# Patient Record
Sex: MALE | Race: WHITE | Employment: UNEMPLOYED | URBAN - METROPOLITAN AREA
[De-identification: names, ages, dates, MRNs, and addresses within clinical notes are randomized per-mention and may not be internally consistent; named-entity substitution may affect disease eponyms.]

---

## 2018-05-03 ENCOUNTER — OFFICE VISIT (OUTPATIENT)
Dept: VASCULAR SURGERY | Age: 48
End: 2018-05-03

## 2018-05-03 VITALS
HEIGHT: 75 IN | WEIGHT: 315 LBS | SYSTOLIC BLOOD PRESSURE: 130 MMHG | HEART RATE: 88 BPM | BODY MASS INDEX: 39.17 KG/M2 | DIASTOLIC BLOOD PRESSURE: 86 MMHG | RESPIRATION RATE: 20 BRPM

## 2018-05-03 DIAGNOSIS — G89.29 OTHER CHRONIC PAIN: ICD-10-CM

## 2018-05-03 DIAGNOSIS — M79.605 PAIN IN BOTH LOWER EXTREMITIES: Primary | ICD-10-CM

## 2018-05-03 DIAGNOSIS — M79.605 PAIN IN BOTH LOWER EXTREMITIES: ICD-10-CM

## 2018-05-03 DIAGNOSIS — G89.29 CHRONIC BACK PAIN, UNSPECIFIED BACK LOCATION, UNSPECIFIED BACK PAIN LATERALITY: ICD-10-CM

## 2018-05-03 DIAGNOSIS — M54.2 NECK PAIN: ICD-10-CM

## 2018-05-03 DIAGNOSIS — Z72.0 TOBACCO ABUSE: ICD-10-CM

## 2018-05-03 DIAGNOSIS — M54.9 CHRONIC BACK PAIN, UNSPECIFIED BACK LOCATION, UNSPECIFIED BACK PAIN LATERALITY: ICD-10-CM

## 2018-05-03 DIAGNOSIS — M79.604 PAIN IN BOTH LOWER EXTREMITIES: Primary | ICD-10-CM

## 2018-05-03 DIAGNOSIS — E66.01 OBESITY, MORBID (HCC): ICD-10-CM

## 2018-05-03 DIAGNOSIS — M79.604 PAIN IN BOTH LOWER EXTREMITIES: ICD-10-CM

## 2018-05-03 RX ORDER — LANCETS 33 GAUGE
EACH MISCELLANEOUS
COMMUNITY

## 2018-05-03 RX ORDER — ATORVASTATIN CALCIUM 10 MG/1
TABLET, FILM COATED ORAL DAILY
COMMUNITY

## 2018-05-03 RX ORDER — PIOGLITAZONEHYDROCHLORIDE 45 MG/1
TABLET ORAL DAILY
COMMUNITY

## 2018-05-03 RX ORDER — LISINOPRIL 20 MG/1
TABLET ORAL DAILY
COMMUNITY

## 2018-05-03 RX ORDER — METFORMIN HYDROCHLORIDE 1000 MG/1
1000 TABLET ORAL 2 TIMES DAILY WITH MEALS
COMMUNITY

## 2018-05-03 RX ORDER — PEN NEEDLE, DIABETIC 31 GX3/16"
NEEDLE, DISPOSABLE MISCELLANEOUS
COMMUNITY

## 2018-05-03 RX ORDER — OMEPRAZOLE 20 MG/1
20 TABLET, DELAYED RELEASE ORAL DAILY
COMMUNITY

## 2018-05-03 RX ORDER — MELATONIN
DAILY
COMMUNITY

## 2018-05-03 NOTE — PROCEDURES
[facility]  *** FINAL REPORT ***    Name: Arun Ingram  MRN: [id number]       Outpatient  : 1970  HIS Order #: [id number]  [id number] [facility] Visit #: [id number]  Date: 03 May 2018    TYPE OF TEST: Peripheral Arterial Testing    REASON FOR TEST  Limb pain    Right Leg  Segmentals: Normal                     mmHg  Brachial         114  High thigh  Low thigh  Calf  Posterior tibial 161  Dorsalis pedis   150  Peroneal  Metatarsal  Toe pressure     117  Doppler:    Normal  Ankle/Brachial: 1.29    Left Leg  Segmentals: Normal                     mmHg  Brachial         125  High thigh  Low thigh  Calf  Posterior tibial 163  Dorsalis pedis   143  Peroneal  Metatarsal  Toe pressure     113  Doppler:    Normal  Ankle/Brachial: 1.30    INTERPRETATION/FINDINGS  Physiologic testing was performed using continuous wave doppler and  segmental pressures. ISAC only:  1. No evidence of significant peripheral arterial disease at rest in  the right leg. 2. No evidence of significant peripheral arterial disease at rest in  the left leg. 3. The right ankle/brachial index is 1.29 and the left ankle/brachial  index is 1.30.  4. The right digital/brachial index is 0.94 and the left  digital/brachial index is 0.90. ADDITIONAL COMMENTS    I have personally reviewed the data relevant to the interpretation of  this  study. TECHNOLOGIST: Angie Blunt RDMS  Signed: 2018 01:47 PM    PHYSICIAN: Micky Deleon D.O.   Signed: 2018 06:08 PM
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
## 2018-05-23 ENCOUNTER — HOSPITAL ENCOUNTER (EMERGENCY)
Age: 48
Discharge: HOME | End: 2018-05-23
Attending: EMERGENCY MEDICINE
Payer: MEDICAID

## 2018-05-23 ENCOUNTER — APPOINTMENT (OUTPATIENT)
Dept: GENERAL RADIOLOGY | Age: 48
End: 2018-05-23
Attending: PHYSICIAN ASSISTANT
Payer: MEDICAID

## 2018-05-23 VITALS
HEIGHT: 74 IN | TEMPERATURE: 98.7 F | RESPIRATION RATE: 19 BRPM | HEART RATE: 88 BPM | DIASTOLIC BLOOD PRESSURE: 78 MMHG | WEIGHT: 315 LBS | SYSTOLIC BLOOD PRESSURE: 128 MMHG | OXYGEN SATURATION: 97 % | BODY MASS INDEX: 40.43 KG/M2

## 2018-05-23 DIAGNOSIS — R73.9 HYPERGLYCEMIA: ICD-10-CM

## 2018-05-23 DIAGNOSIS — J20.9 ACUTE BRONCHITIS, UNSPECIFIED ORGANISM: Primary | ICD-10-CM

## 2018-05-23 DIAGNOSIS — M25.562 ARTHRALGIA OF LEFT LOWER LEG: ICD-10-CM

## 2018-05-23 DIAGNOSIS — R11.2 NON-INTRACTABLE VOMITING WITH NAUSEA, UNSPECIFIED VOMITING TYPE: ICD-10-CM

## 2018-05-23 LAB
ALBUMIN SERPL-MCNC: 3.3 G/DL (ref 3.4–5)
ALBUMIN/GLOB SERPL: 0.9 {RATIO} (ref 0.8–1.7)
ALP SERPL-CCNC: 66 U/L (ref 45–117)
ALT SERPL-CCNC: 44 U/L (ref 16–61)
ANION GAP SERPL CALC-SCNC: 8 MMOL/L (ref 3–18)
AST SERPL-CCNC: 22 U/L (ref 15–37)
BASOPHILS # BLD: 0 K/UL (ref 0–0.06)
BASOPHILS NFR BLD: 0 % (ref 0–2)
BILIRUB SERPL-MCNC: 0.3 MG/DL (ref 0.2–1)
BUN SERPL-MCNC: 12 MG/DL (ref 7–18)
BUN/CREAT SERPL: 18 (ref 12–20)
CALCIUM SERPL-MCNC: 8.9 MG/DL (ref 8.5–10.1)
CHLORIDE SERPL-SCNC: 104 MMOL/L (ref 100–108)
CO2 SERPL-SCNC: 30 MMOL/L (ref 21–32)
CREAT SERPL-MCNC: 0.68 MG/DL (ref 0.6–1.3)
DIFFERENTIAL METHOD BLD: ABNORMAL
EOSINOPHIL # BLD: 0.1 K/UL (ref 0–0.4)
EOSINOPHIL NFR BLD: 1 % (ref 0–5)
ERYTHROCYTE [DISTWIDTH] IN BLOOD BY AUTOMATED COUNT: 14.9 % (ref 11.6–14.5)
GLOBULIN SER CALC-MCNC: 3.7 G/DL (ref 2–4)
GLUCOSE SERPL-MCNC: 148 MG/DL (ref 74–99)
HCT VFR BLD AUTO: 44.8 % (ref 36–48)
HGB BLD-MCNC: 14.4 G/DL (ref 13–16)
LIPASE SERPL-CCNC: 89 U/L (ref 73–393)
LYMPHOCYTES # BLD: 2.8 K/UL (ref 0.9–3.6)
LYMPHOCYTES NFR BLD: 25 % (ref 21–52)
MCH RBC QN AUTO: 27.5 PG (ref 24–34)
MCHC RBC AUTO-ENTMCNC: 32.1 G/DL (ref 31–37)
MCV RBC AUTO: 85.7 FL (ref 74–97)
MONOCYTES # BLD: 0.8 K/UL (ref 0.05–1.2)
MONOCYTES NFR BLD: 7 % (ref 3–10)
NEUTS SEG # BLD: 7.6 K/UL (ref 1.8–8)
NEUTS SEG NFR BLD: 67 % (ref 40–73)
PLATELET # BLD AUTO: 211 K/UL (ref 135–420)
PMV BLD AUTO: 10.7 FL (ref 9.2–11.8)
POTASSIUM SERPL-SCNC: 3.5 MMOL/L (ref 3.5–5.5)
PROT SERPL-MCNC: 7 G/DL (ref 6.4–8.2)
RBC # BLD AUTO: 5.23 M/UL (ref 4.7–5.5)
SODIUM SERPL-SCNC: 142 MMOL/L (ref 136–145)
WBC # BLD AUTO: 11.4 K/UL (ref 4.6–13.2)

## 2018-05-23 PROCEDURE — 85025 COMPLETE CBC W/AUTO DIFF WBC: CPT | Performed by: PHYSICIAN ASSISTANT

## 2018-05-23 PROCEDURE — 71046 X-RAY EXAM CHEST 2 VIEWS: CPT

## 2018-05-23 PROCEDURE — 73564 X-RAY EXAM KNEE 4 OR MORE: CPT

## 2018-05-23 PROCEDURE — 96374 THER/PROPH/DIAG INJ IV PUSH: CPT

## 2018-05-23 PROCEDURE — 74011250636 HC RX REV CODE- 250/636: Performed by: PHYSICIAN ASSISTANT

## 2018-05-23 PROCEDURE — 83690 ASSAY OF LIPASE: CPT | Performed by: PHYSICIAN ASSISTANT

## 2018-05-23 PROCEDURE — 99282 EMERGENCY DEPT VISIT SF MDM: CPT

## 2018-05-23 PROCEDURE — 80053 COMPREHEN METABOLIC PANEL: CPT | Performed by: PHYSICIAN ASSISTANT

## 2018-05-23 RX ORDER — ONDANSETRON 2 MG/ML
4 INJECTION INTRAMUSCULAR; INTRAVENOUS
Status: COMPLETED | OUTPATIENT
Start: 2018-05-23 | End: 2018-05-23

## 2018-05-23 RX ORDER — DOXYCYCLINE 100 MG/1
100 CAPSULE ORAL 2 TIMES DAILY
Qty: 42 CAP | Refills: 0 | Status: SHIPPED | OUTPATIENT
Start: 2018-05-23 | End: 2018-06-13

## 2018-05-23 RX ORDER — ONDANSETRON 4 MG/1
TABLET, ORALLY DISINTEGRATING ORAL
Qty: 10 TAB | Refills: 0 | Status: SHIPPED | OUTPATIENT
Start: 2018-05-23

## 2018-05-23 RX ADMIN — ONDANSETRON HYDROCHLORIDE 4 MG: 2 INJECTION, SOLUTION INTRAMUSCULAR; INTRAVENOUS at 19:04

## 2018-05-23 NOTE — ED PROVIDER NOTES
EMERGENCY DEPARTMENT HISTORY AND PHYSICAL EXAM    6:36 PM      Date: 5/23/2018  Patient Name: Marcia Shahid    History of Presenting Illness     Chief Complaint   Patient presents with    Vomiting    Joint Pain         History Provided By: Patient    Chief Complaint: n/v, weakness  Duration:  Hours  Timing:  Acute  Location: n/a  Quality: n/a  Severity: Moderate  Modifying Factors: none  Associated Symptoms: body aches, productive cough      Additional History (Context): Marcia Shahid is a 50 y.o. male with hx of DM, HTN, COPD, anxiety who presents with c/o productive cough x 1 week. Pt notes \"chunky white mucous\". Also notes n/v x 1 day associated with chills. Denies fever, chest pain, dyspnea, abdominal pain, dysuria, hematuria. Denies sick contacts. +smoker. Also notes tick bite 4/26/18. Notes he pulled the tick out from his groin region. Saw his PMD for follow-up and was placed on Doxycycline, which he completed. Notes persistent joint aches and pain. PCP: None    Current Outpatient Prescriptions   Medication Sig Dispense Refill    doxycycline (MONODOX) 100 mg capsule Take 1 Cap by mouth two (2) times a day for 21 days. 42 Cap 0    ondansetron (ZOFRAN ODT) 4 mg disintegrating tablet Take 1-2 tablets every 6-8 hours as needed for nausea and vomiting. 10 Tab 0    insulin glargine,hum.rec.anlog (BASAGLAR KWIKPEN U-100 INSULIN SC) by SubCUTAneous route. 40 units SQ bis      cholecalciferol (VITAMIN D3) 1,000 unit tablet Take  by mouth daily. 3 daily      Insulin Needles, Disposable, (PEN NEEDLE) 31 gauge x 3/16\" ndle by Does Not Apply route.  metFORMIN (GLUCOPHAGE) 1,000 mg tablet Take 1,000 mg by mouth two (2) times daily (with meals).  pioglitazone (ACTOS) 45 mg tablet Take  by mouth daily.  atorvastatin (LIPITOR) 10 mg tablet Take  by mouth daily.  lancets (TRUEPLUS LANCETS) 33 gauge misc by Does Not Apply route.       glucose blood VI test strips (CONTOUR NEXT TEST STRIPS) strip by Does Not Apply route See Admin Instructions. Twice daily      lisinopril (PRINIVIL, ZESTRIL) 20 mg tablet Take  by mouth daily.  empagliflozin (JARDIANCE) 10 mg tablet Take  by mouth daily.  omeprazole (PRILOSEC OTC) 20 mg tablet Take 20 mg by mouth daily.  omega-3s-dha-epa-fish oil (SEA-OMEGA) 200 mg-300 mg- 100 mg-1,000 mg cap Take  by mouth.  methadone (DOLOPHINE) 10 mg tablet Take  by mouth every eight (8) hours as needed for Pain.  oxyCODONE IR (OXY-IR) 15 mg immediate release tablet Take 15 mg by mouth five (5) times daily. Past History     Past Medical History:  Past Medical History:   Diagnosis Date    Anxiety     Arthritis     Asthma     Chronic obstructive pulmonary disease ([facility] [address]     Chronic pain     Diabetes ([facility] [address]     Hypertension     Sleep apnea     Tobacco abuse        Past Surgical History:  Past Surgical History:   Procedure Laterality Date    HX HERNIA REPAIR         Family History:  Family History   Problem Relation Age of Onset    Diabetes Father     Heart Disease Father     Hypertension Father        Social History:  Social History   Substance Use Topics    Smoking status: Current Every Day Smoker     Packs/day: 1.00    Smokeless tobacco: Never Used    Alcohol use No       Allergies:  No Known Allergies      Review of Systems       Review of Systems   Constitutional: Negative for chills and fever. Respiratory: Positive for cough. Negative for shortness of breath. Cardiovascular: Negative for chest pain. Gastrointestinal: Positive for nausea and vomiting. Negative for abdominal pain and diarrhea. Musculoskeletal: Positive for myalgias. Skin: Negative for rash. Neurological: Negative for weakness. All other systems reviewed and are negative.         Physical Exam     Visit Vitals    /78    Pulse 88    Temp 98.7 °F (37.1 °C)    Resp 19    Ht 6' 2\" (1.88 m)    Wt (!) 163.3 kg (360 lb)    SpO2 97%    BMI 46.22 kg/m2 Physical Exam   Constitutional: He is oriented to person, place, and time. He appears well-developed and well-nourished. No distress. HENT:   Head: Normocephalic and atraumatic. Neck: Normal range of motion. Neck supple. Cardiovascular: Normal rate, regular rhythm and normal heart sounds. Exam reveals no gallop and no friction rub. No murmur heard. Pulmonary/Chest: Effort normal and breath sounds normal. No respiratory distress. He has no wheezes. He has no rales. Abdominal: Soft. He exhibits no distension and no mass. There is no tenderness. There is no rebound and no guarding. Musculoskeletal: He exhibits no edema or deformity. No joint swelling or erythema   Neurological: He is alert and oriented to person, place, and time. Skin: Skin is warm. No rash noted. He is not diaphoretic. No rash   Nursing note and vitals reviewed. Diagnostic Study Results     Labs -  Recent Results (from the past 12 hour(s))   CBC WITH AUTOMATED DIFF    Collection Time: 05/23/18  6:35 PM   Result Value Ref Range    WBC 11.4 4.6 - 13.2 K/uL    RBC 5.23 4.70 - 5.50 M/uL    HGB 14.4 13.0 - 16.0 g/dL    HCT 44.8 36.0 - 48.0 %    MCV 85.7 74.0 - 97.0 FL    MCH 27.5 24.0 - 34.0 PG    MCHC 32.1 31.0 - 37.0 g/dL    RDW 14.9 (H) 11.6 - 14.5 %    PLATELET 616 563 - 635 K/uL    MPV 10.7 9.2 - 11.8 FL    NEUTROPHILS 67 40 - 73 %    LYMPHOCYTES 25 21 - 52 %    MONOCYTES 7 3 - 10 %    EOSINOPHILS 1 0 - 5 %    BASOPHILS 0 0 - 2 %    ABS. NEUTROPHILS 7.6 1.8 - 8.0 K/UL    ABS. LYMPHOCYTES 2.8 0.9 - 3.6 K/UL    ABS. MONOCYTES 0.8 0.05 - 1.2 K/UL    ABS. EOSINOPHILS 0.1 0.0 - 0.4 K/UL    ABS.  BASOPHILS 0.0 0.0 - 0.06 K/UL    DF AUTOMATED     METABOLIC PANEL, COMPREHENSIVE    Collection Time: 05/23/18  6:35 PM   Result Value Ref Range    Sodium 142 136 - 145 mmol/L    Potassium 3.5 3.5 - 5.5 mmol/L    Chloride 104 100 - 108 mmol/L    CO2 30 21 - 32 mmol/L    Anion gap 8 3.0 - 18 mmol/L    Glucose 148 (H) 74 - 99 mg/dL BUN 12 7.0 - 18 MG/DL    Creatinine 0.68 0.6 - 1.3 MG/DL    BUN/Creatinine ratio 18 12 - 20      GFR est AA >60 >60 ml/min/1.73m2    GFR est non-AA >60 >60 ml/min/1.73m2    Calcium 8.9 8.5 - 10.1 MG/DL    Bilirubin, total 0.3 0.2 - 1.0 MG/DL    ALT (SGPT) 44 16 - 61 U/L    AST (SGOT) 22 15 - 37 U/L    Alk. phosphatase 66 45 - 117 U/L    Protein, total 7.0 6.4 - 8.2 g/dL    Albumin 3.3 (L) 3.4 - 5.0 g/dL    Globulin 3.7 2.0 - 4.0 g/dL    A-G Ratio 0.9 0.8 - 1.7     LIPASE    Collection Time: 05/23/18  6:35 PM   Result Value Ref Range    Lipase 89 73 - 393 U/L       Radiologic Studies -   XR CHEST PA LAT    (Results Pending)   XR KNEE LT MIN 4 V    (Results Pending)   RADIOLOGY FINDINGS  L knee X-ray shows no acute process  Pending review by Radiologist  Recorded by Keith Lancaster PA-C    CXR shows no acute process    Medical Decision Making   I am the first provider for this patient. I reviewed the vital signs, available nursing notes, past medical history, past surgical history, family history and social history. Vital Signs-Reviewed the patient's vital signs. Pulse Oximetry Analysis -  97 on room air    Records Reviewed: Nursing Notes and Old Medical Records (Time of Review: 6:36 PM)    ED Course: Progress Notes, Reevaluation, and Consults:  7:30 PM: Pt resting comfortably with family member. Notes L knee pain as well. Atraumatic. No erythema, ecchymosis or edema   8:05 PM: Reviewed results with patient . Discussed need for close outpatient follow-up with PMD.     Provider Notes (Medical Decision Making): 51 yo M who presents due to productive cough x 1 week and n/v x 1 day . Afebrile, VS stable, looks well. CXR without acute process. Lungs CTAB. Also notes n/v x 1 day. Abdomen non-tender to palpation, tolerating PO. CBC, CMP, lipase essentially unremarkable. Pt with recent tick exposure 1 month ago, was placed on 10 days of Doxycycline. Pt without rash or joint swelling/erythema.  Notes persistent joint aches. Will cover with Doxycycline x 3 weeks. Will have patient follow-up as outpatient with PMD.    Diagnosis     Clinical Impression:   1. Acute bronchitis, unspecified organism    2. Non-intractable vomiting with nausea, unspecified vomiting type    3. Arthralgia of left lower leg    4. Hyperglycemia      Disposition: home     Follow-up Information     Follow up With Details Comments Contact Info    [facility] EMERGENCY DEPT  If symptoms worsen [address] [address] [phone]           Patient's Medications   Start Taking    DOXYCYCLINE (MONODOX) 100 MG CAPSULE    Take 1 Cap by mouth two (2) times a day for 21 days. ONDANSETRON (ZOFRAN ODT) 4 MG DISINTEGRATING TABLET    Take 1-2 tablets every 6-8 hours as needed for nausea and vomiting. Continue Taking    ATORVASTATIN (LIPITOR) 10 MG TABLET    Take  by mouth daily. CHOLECALCIFEROL (VITAMIN D3) 1,000 UNIT TABLET    Take  by mouth daily. 3 daily    EMPAGLIFLOZIN (JARDIANCE) 10 MG TABLET    Take  by mouth daily. GLUCOSE BLOOD VI TEST STRIPS (CONTOUR NEXT TEST STRIPS) STRIP    by Does Not Apply route See Admin Instructions. Twice daily    INSULIN GLARGINE,HUM. REC. ANLOG (BASAGLAR KWIKPEN U-100 INSULIN SC)    by SubCUTAneous route. 40 units SQ bis    INSULIN NEEDLES, DISPOSABLE, (PEN NEEDLE) 31 GAUGE X 3/16\" NDLE    by Does Not Apply route. LANCETS (TRUEPLUS LANCETS) 33 GAUGE MISC    by Does Not Apply route. LISINOPRIL (PRINIVIL, ZESTRIL) 20 MG TABLET    Take  by mouth daily. METFORMIN (GLUCOPHAGE) 1,000 MG TABLET    Take 1,000 mg by mouth two (2) times daily (with meals). METHADONE (DOLOPHINE) 10 MG TABLET    Take  by mouth every eight (8) hours as needed for Pain. OMEGA-3S-DHA-EPA-FISH OIL (SEA-OMEGA) 200 MG-300 MG- 100 MG-1,000 MG CAP    Take  by mouth. OMEPRAZOLE (PRILOSEC OTC) 20 MG TABLET    Take 20 mg by mouth daily.     OXYCODONE IR (OXY-IR) 15 MG IMMEDIATE RELEASE TABLET    Take 15 mg by mouth five (5) times daily. PIOGLITAZONE (ACTOS) 45 MG TABLET    Take  by mouth daily.    These Medications have changed    No medications on file   Stop Taking    No medications on file

## 2018-05-23 NOTE — ED TRIAGE NOTES
Had tick bite 3 weeks ago. Since then has been having fever, nausea, vomiting, and joint pain.  Sent here by [facility] for further evaluation

## 2018-05-24 NOTE — DISCHARGE INSTRUCTIONS
Learning About High Blood Sugar  What is high blood sugar? Your body turns the food you eat into glucose (sugar), which it uses for energy. But if your body isn't able to use the sugar right away, it can build up in your blood and lead to high blood sugar. When the amount of sugar in your blood stays too high for too much of the time, you may have diabetes. Diabetes is a disease that can cause serious health problems. The good news is that lifestyle changes may help you get your blood sugar back to normal and avoid or delay diabetes. What causes high blood sugar? Sugar (glucose) can build up in your blood if you:  · Are overweight. · Have a family history of diabetes. · Take certain medicines, such as steroids. What are the symptoms? Having high blood sugar may not cause any symptoms at all. Or it may make you feel very thirsty or very hungry. You may also urinate more often than usual, have blurry vision, or lose weight without trying. How is high blood sugar treated? You can take steps to lower your blood sugar level if you understand what makes it get higher. Your doctor may want you to learn how to test your blood sugar level at home. Then you can see how illness, stress, or different kinds of food or medicine raise or lower your blood sugar level. Other tests may be needed to see if you have diabetes. How can you prevent high blood sugar? · Watch your weight. If you're overweight, losing just a small amount of weight may help. Reducing fat around your waist is most important. · Limit the amount of calories, sweets, and unhealthy fat you eat. Ask your doctor if a dietitian can help you. A registered dietitian can help you create meal plans that fit your lifestyle. · Get at least 30 minutes of exercise on most days of the week. Exercise helps control your blood sugar. It also helps you maintain a healthy weight. Walking is a good choice.  You also may want to do other activities, such as running, swimming, cycling, or playing tennis or team sports. · If your doctor prescribed medicines, take them exactly as prescribed. Call your doctor if you think you are having a problem with your medicine. You will get more details on the specific medicines your doctor prescribes. Follow-up care is a key part of your treatment and safety. Be sure to make and go to all appointments, and call your doctor if you are having problems. It's also a good idea to know your test results and keep a list of the medicines you take. Where can you learn more? Go to http://booShiftboard Online Schedulingtimothy.info/. Enter O108 in the search box to learn more about \"Learning About High Blood Sugar. \"  Current as of: March 13, 2017  Content Version: 11.4  © 2006-2017 [employer]. Care instructions adapted under license by [employer] (which disclaims liability or warranty for this information). If you have questions about a medical condition or this instruction, always ask your healthcare professional. [address] any warranty or liability for your use of this information. Bronchitis: Care Instructions  Your Care Instructions    Bronchitis is inflammation of the bronchial tubes, which carry air to the lungs. The tubes swell and produce mucus, or phlegm. The mucus and inflamed bronchial tubes make you cough. You may have trouble breathing. Most cases of bronchitis are caused by viruses like those that cause colds. Antibiotics usually do not help and they may be harmful. Bronchitis usually develops rapidly and lasts about 2 to 3 weeks in otherwise healthy people. Follow-up care is a key part of your treatment and safety. Be sure to make and go to all appointments, and call your doctor if you are having problems. It's also a good idea to know your test results and keep a list of the medicines you take. How can you care for yourself at home?   · Take all medicines exactly as prescribed. Call your doctor if you think you are having a problem with your medicine. · Get some extra rest.  · Take an over-the-counter pain medicine, such as acetaminophen (Tylenol), ibuprofen (Advil, Motrin), or naproxen (Aleve) to reduce fever and relieve body aches. Read and follow all instructions on the label. · Do not take two or more pain medicines at the same time unless the doctor told you to. Many pain medicines have acetaminophen, which is Tylenol. Too much acetaminophen (Tylenol) can be harmful. · Take an over-the-counter cough medicine that contains dextromethorphan to help quiet a dry, hacking cough so that you can sleep. Avoid cough medicines that have more than one active ingredient. Read and follow all instructions on the label. · Breathe moist air from a humidifier, hot shower, or sink filled with hot water. The heat and moisture will thin mucus so you can cough it out. · Do not smoke. Smoking can make bronchitis worse. If you need help quitting, talk to your doctor about stop-smoking programs and medicines. These can increase your chances of quitting for good. When should you call for help? Call 911 anytime you think you may need emergency care. For example, call if:  ? · You have severe trouble breathing. ?Call your doctor now or seek immediate medical care if:  ? · You have new or worse trouble breathing. ? · You cough up dark brown or bloody mucus (sputum). ? · You have a new or higher fever. ? · You have a new rash. ? Watch closely for changes in your health, and be sure to contact your doctor if:  ? · You cough more deeply or more often, especially if you notice more mucus or a change in the color of your mucus. ? · You are not getting better as expected. Where can you learn more? Go to http://boo-timothy.info/. Enter H333 in the search box to learn more about \"Bronchitis: Care Instructions. \"  Current as of:  May 12, 2017  Content Version: 11.4  © 7588-6508 [employer]. Care instructions adapted under license by [employer] (which disclaims liability or warranty for this information). If you have questions about a medical condition or this instruction, always ask your healthcare professional. [address] any warranty or liability for your use of this information. Joint Pain: Care Instructions  Your Care Instructions    Many people have small aches and pains from overuse or injury to muscles and joints. Joint injuries often happen during sports or recreation, work tasks, or projects around the home. An overuse injury can happen when you put too much stress on a joint or when you do an activity that stresses the joint over and over, such as using the computer or rowing a boat. You can take action at home to help your muscles and joints get better. You should feel better in 1 to 2 weeks, but it can take 3 months or more to heal completely. Follow-up care is a key part of your treatment and safety. Be sure to make and go to all appointments, and call your doctor if you are having problems. It's also a good idea to know your test results and keep a list of the medicines you take. How can you care for yourself at home? · Do not put weight on the injured joint for at least a day or two. · For the first day or two after an injury, do not take hot showers or baths, and do not use hot packs. The heat could make swelling worse. · Put ice or a cold pack on the sore joint for 10 to 20 minutes at a time. Try to do this every 1 to 2 hours for the next 3 days (when you are awake) or until the swelling goes down. Put a thin cloth between the ice and your skin. · Wrap the injury in an elastic bandage. Do not wrap it too tightly because this can cause more swelling. · Prop up the sore joint on a pillow when you ice it or anytime you sit or lie down during the next 3 days. Try to keep it above the level of your heart. This will help reduce swelling. · Take an over-the-counter pain medicine, such as acetaminophen (Tylenol), ibuprofen (Advil, Motrin), or naproxen (Aleve). Read and follow all instructions on the label. · After 1 or 2 days of rest, begin moving the joint gently. While the joint is still healing, you can begin to exercise using activities that do not strain or hurt the painful joint. When should you call for help? Call your doctor now or seek immediate medical care if:  ? · You have signs of infection, such as:  ¨ Increased pain, swelling, warmth, and redness. ¨ Red streaks leading from the joint. ¨ A fever. ? Watch closely for changes in your health, and be sure to contact your doctor if:  ? · Your movement or symptoms are not getting better after 1 to 2 weeks of home treatment. Where can you learn more? Go to http://obo-timothy.info/. Enter P205 in the search box to learn more about \"Joint Pain: Care Instructions. \"  Current as of: March 21, 2017  Content Version: 11.4  © 7196-5728 [employer]. Care instructions adapted under license by "[employer]" (which disclaims liability or warranty for this information). If you have questions about a medical condition or this instruction, always ask your healthcare professional. [address] any warranty or liability for your use of this information.

## 2018-10-24 ENCOUNTER — HOSPITAL ENCOUNTER (OUTPATIENT)
Age: 48
Discharge: HOME | End: 2018-10-24
Attending: INTERNAL MEDICINE

## 2018-10-24 DIAGNOSIS — Z72.0 TOBACCO USE: ICD-10-CM

## 2018-10-24 DIAGNOSIS — E78.2 MIXED HYPERLIPIDEMIA: ICD-10-CM

## 2018-10-24 DIAGNOSIS — K76.0 FATTY (CHANGE OF) LIVER, NOT ELSEWHERE CLASSIFIED: ICD-10-CM

## 2018-10-24 DIAGNOSIS — E11.65 TYPE 2 DIABETES MELLITUS WITH HYPERGLYCEMIA (HCC): ICD-10-CM

## 2018-10-24 DIAGNOSIS — I10 ESSENTIAL (PRIMARY) HYPERTENSION: ICD-10-CM

## 2018-10-24 DIAGNOSIS — E66.01 MORBID (SEVERE) OBESITY DUE TO EXCESS CALORIES (HCC): ICD-10-CM

## 2018-10-24 DIAGNOSIS — G62.9 POLYNEUROPATHY, UNSPECIFIED: ICD-10-CM

## 2018-10-24 DIAGNOSIS — E11.42 TYPE 2 DIABETES MELLITUS WITH DIABETIC POLYNEUROPATHY (HCC): ICD-10-CM

## 2018-10-24 DIAGNOSIS — E24.9 CUSHING'S SYNDROME, UNSPECIFIED (HCC): ICD-10-CM

## 2018-10-24 DIAGNOSIS — E55.9 VITAMIN D DEFICIENCY: ICD-10-CM

## 2022-03-19 PROBLEM — E66.01 OBESITY, MORBID (HCC): Status: ACTIVE | Noted: 2018-05-03

## 2023-07-27 RX ORDER — INSULIN GLARGINE 100 [IU]/ML
60 INJECTION, SOLUTION SUBCUTANEOUS EVERY MORNING
COMMUNITY
Start: 2021-11-28

## 2023-07-27 RX ORDER — ATORVASTATIN CALCIUM 10 MG/1
10 TABLET, FILM COATED ORAL DAILY
COMMUNITY
Start: 2023-06-22

## 2023-07-27 RX ORDER — PIOGLITAZONEHYDROCHLORIDE 30 MG/1
1 TABLET ORAL DAILY
COMMUNITY
Start: 2023-04-11

## 2023-07-27 RX ORDER — ALBUTEROL SULFATE 90 UG/1
2 AEROSOL, METERED RESPIRATORY (INHALATION)
COMMUNITY
Start: 2023-07-05

## 2023-07-27 RX ORDER — BUDESONIDE AND FORMOTEROL FUMARATE DIHYDRATE 160; 4.5 UG/1; UG/1
2 AEROSOL RESPIRATORY (INHALATION) 2 TIMES DAILY
COMMUNITY
Start: 2023-04-11

## 2023-07-27 RX ORDER — OXYCODONE HYDROCHLORIDE 10 MG/1
10 TABLET ORAL
COMMUNITY

## 2023-07-27 RX ORDER — DULAGLUTIDE 3 MG/.5ML
3 INJECTION, SOLUTION SUBCUTANEOUS
COMMUNITY
Start: 2021-04-05

## 2023-07-27 RX ORDER — IBUPROFEN 800 MG/1
800 TABLET ORAL EVERY 6 HOURS PRN
COMMUNITY
Start: 2017-01-19

## 2023-07-27 RX ORDER — OMEPRAZOLE 20 MG/1
20 TABLET, DELAYED RELEASE ORAL DAILY
COMMUNITY
Start: 2022-10-05

## 2023-07-27 RX ORDER — LISINOPRIL 20 MG/1
1 TABLET ORAL DAILY
COMMUNITY
Start: 2021-09-19

## 2023-07-27 NOTE — PERIOP NOTE
Instructions for your surgery at [address] [place] [address] Date:  7/27/2023      Patient's Name:  Tran Cartwright           Surgery Date:  08/10/2023              Please enter the main entrance of the hospital and check-in at the  located in the lobby. Once registered, you will take the elevators to the second floor, check in at the desk or call ext [phone] and proceed to the surgical waiting room. Someone will come escort you to the pre-op area. Do NOT eat or drink anything, including candy, gum, or ice chips after midnight prior to your surgery, unless you have specific instructions from your surgeon or anesthesia provider to do so. Brush your teeth before coming to the hospital. You may swish with water, but do not swallow. No smoking/Vaping/E-Cigarettes 24 hours prior to the day of surgery. No alcohol 24 hours prior to the day of surgery. No recreational drugs for one week prior to the day of surgery. Bring Photo ID, Insurance information, and Co-pay if required on day of surgery. Bring in pertinent legal documents, such as, Medical Power of CITLALLI MATTHEW, DNR, Advance Directive, etc.  Leave all valuables, including money/purse, at home. Remove all jewelry, including ALL body piercings, nail polish, acrylic nails, and makeup (including mascara); no lotions, powders, deodorant, or perfume/cologne/after shave on the skin. Follow instruction for Hibiclens washes and CHG wipes from surgeon's office. Glasses and dentures may be worn to the hospital. They must be removed prior to surgery. Please bring case/container for glasses or dentures. Contact lenses should not be worn on day of surgery. Call your doctor's office if symptoms of a cold or illness develop within 24-48 hours prior to your surgery. 14. AN ADULT (relative or friend 25 years or older) [address].   15. Please make arrangements for a responsible adult (18 years or older) to be with

## 2023-08-09 ENCOUNTER — ANESTHESIA EVENT (OUTPATIENT)
Facility: HOSPITAL | Age: 53
End: 2023-08-09
Payer: MEDICAID

## 2023-08-10 ENCOUNTER — HOSPITAL ENCOUNTER (OUTPATIENT)
Facility: HOSPITAL | Age: 53
Setting detail: OUTPATIENT SURGERY
Discharge: HOME | End: 2023-08-10
Attending: INTERNAL MEDICINE | Admitting: INTERNAL MEDICINE
Payer: MEDICAID

## 2023-08-10 ENCOUNTER — ANESTHESIA (OUTPATIENT)
Facility: HOSPITAL | Age: 53
End: 2023-08-10
Payer: MEDICAID

## 2023-08-10 VITALS
RESPIRATION RATE: 16 BRPM | BODY MASS INDEX: 40.43 KG/M2 | OXYGEN SATURATION: 94 % | SYSTOLIC BLOOD PRESSURE: 119 MMHG | HEART RATE: 78 BPM | HEIGHT: 74 IN | TEMPERATURE: 97.9 F | DIASTOLIC BLOOD PRESSURE: 76 MMHG | WEIGHT: 315 LBS

## 2023-08-10 LAB
AMPHET UR QL SCN: NEGATIVE
BARBITURATES UR QL SCN: NEGATIVE
BENZODIAZ UR QL: NEGATIVE
CANNABINOIDS UR QL SCN: NEGATIVE
COCAINE UR QL SCN: NEGATIVE
GLUCOSE BLD STRIP.AUTO-MCNC: 122 MG/DL (ref 70–110)
GLUCOSE BLD STRIP.AUTO-MCNC: 133 MG/DL (ref 70–110)
Lab: ABNORMAL
METHADONE UR QL: NEGATIVE
OPIATES UR QL: POSITIVE
PCP UR QL: NEGATIVE

## 2023-08-10 PROCEDURE — 82962 GLUCOSE BLOOD TEST: CPT

## 2023-08-10 PROCEDURE — 2580000003 HC RX 258: Performed by: NURSE ANESTHETIST, CERTIFIED REGISTERED

## 2023-08-10 PROCEDURE — 7100000000 HC PACU RECOVERY - FIRST 15 MIN: Performed by: INTERNAL MEDICINE

## 2023-08-10 PROCEDURE — 3600007512: Performed by: INTERNAL MEDICINE

## 2023-08-10 PROCEDURE — 2709999900 HC NON-CHARGEABLE SUPPLY: Performed by: INTERNAL MEDICINE

## 2023-08-10 PROCEDURE — 80307 DRUG TEST PRSMV CHEM ANLYZR: CPT

## 2023-08-10 PROCEDURE — 3700000001 HC ADD 15 MINUTES (ANESTHESIA): Performed by: INTERNAL MEDICINE

## 2023-08-10 PROCEDURE — 3700000000 HC ANESTHESIA ATTENDED CARE: Performed by: INTERNAL MEDICINE

## 2023-08-10 PROCEDURE — 2500000003 HC RX 250 WO HCPCS: Performed by: NURSE ANESTHETIST, CERTIFIED REGISTERED

## 2023-08-10 PROCEDURE — 7100000010 HC PHASE II RECOVERY - FIRST 15 MIN: Performed by: INTERNAL MEDICINE

## 2023-08-10 PROCEDURE — 6360000002 HC RX W HCPCS: Performed by: NURSE ANESTHETIST, CERTIFIED REGISTERED

## 2023-08-10 PROCEDURE — 3600007502: Performed by: INTERNAL MEDICINE

## 2023-08-10 RX ORDER — PROPOFOL 10 MG/ML
INJECTION, EMULSION INTRAVENOUS PRN
Status: DISCONTINUED | OUTPATIENT
Start: 2023-08-10 | End: 2023-08-10 | Stop reason: SDUPTHER

## 2023-08-10 RX ORDER — SODIUM CHLORIDE, SODIUM LACTATE, POTASSIUM CHLORIDE, CALCIUM CHLORIDE 600; 310; 30; 20 MG/100ML; MG/100ML; MG/100ML; MG/100ML
INJECTION, SOLUTION INTRAVENOUS CONTINUOUS
Status: DISCONTINUED | OUTPATIENT
Start: 2023-08-10 | End: 2023-08-10 | Stop reason: HOSPADM

## 2023-08-10 RX ORDER — LIDOCAINE HYDROCHLORIDE 10 MG/ML
1 INJECTION, SOLUTION EPIDURAL; INFILTRATION; INTRACAUDAL; PERINEURAL
Status: COMPLETED | OUTPATIENT
Start: 2023-08-10 | End: 2023-08-10

## 2023-08-10 RX ADMIN — PROPOFOL 50 MG: 10 INJECTION, EMULSION INTRAVENOUS at 08:33

## 2023-08-10 RX ADMIN — PROPOFOL 150 MG: 10 INJECTION, EMULSION INTRAVENOUS at 08:12

## 2023-08-10 RX ADMIN — PROPOFOL 50 MG: 10 INJECTION, EMULSION INTRAVENOUS at 08:23

## 2023-08-10 RX ADMIN — LIDOCAINE HYDROCHLORIDE 20 ML: 10 INJECTION, SOLUTION EPIDURAL; INFILTRATION; INTRACAUDAL; PERINEURAL at 08:12

## 2023-08-10 RX ADMIN — PROPOFOL 50 MG: 10 INJECTION, EMULSION INTRAVENOUS at 08:26

## 2023-08-10 RX ADMIN — PROPOFOL 50 MG: 10 INJECTION, EMULSION INTRAVENOUS at 08:19

## 2023-08-10 RX ADMIN — PROPOFOL 50 MG: 10 INJECTION, EMULSION INTRAVENOUS at 08:30

## 2023-08-10 RX ADMIN — SODIUM CHLORIDE, POTASSIUM CHLORIDE, SODIUM LACTATE AND CALCIUM CHLORIDE: 600; 310; 30; 20 INJECTION, SOLUTION INTRAVENOUS at 07:39

## 2023-08-10 RX ADMIN — PROPOFOL 50 MG: 10 INJECTION, EMULSION INTRAVENOUS at 08:36

## 2023-08-10 RX ADMIN — PROPOFOL 50 MG: 10 INJECTION, EMULSION INTRAVENOUS at 08:15

## 2023-08-10 ASSESSMENT — COPD QUESTIONNAIRES: CAT_SEVERITY: NO INTERVAL CHANGE

## 2023-08-10 ASSESSMENT — PAIN SCALES - GENERAL: PAINLEVEL_OUTOF10: 0

## 2023-08-10 NOTE — H&P
allergies  Immunizations:   Influenza, seasonal, injectable (refused), refused  pneumococcal polysaccharide PPV23 (refused), 2018 est.  SARS-CoV-2, 1st dose only  Social History  Alcohol:   None  Tobacco:   Current every day smoker  Cigarettes 10 cigarettes a day. Drugs:   Medical Marijuana. Exercise:   None  Caffeine:   Occasionally. Marital Status:         Occupation:       Family History   No history of Celiac Sprue, Colon cancer, Colon Polyps, Crohn's Disease, Gallbladder Disease, Inflammatory Bowel Disease, Liver Disease, Polyps, Stomach Cancer, Ulcerative Colitis  Review of Systems:  Allergic/Immunologic: Denies allergic reactions, current infections. Cardiovascular: Denies chest pain, irregular heart beat, rapid heart rate/palpitations, ankle swelling. Constitutional: Denies fever, loss of appetite, weight loss. ENMT: Denies nose bleeds, loss of vision, hoarseness, mouth sores. Endocrine: Denies excessive thirst, heat or cold intolerance. Gastrointestinal: Denies abdominal pain, abdominal swelling, change in bowel habits, constipation, diarrhea, gas, heartburn, nausea, rectal bleeding, stomach cramps, vomiting, difficulty swallowing, yellowing of skin. Genitourinary: Denies blood in urine, recent darkening of urine. Hematologic/Lymphatic: Denies easy bruising, anemia. Integumentary: Denies itching, rashes, rashes/hives. Musculoskeletal: Denies back pain, joint pain/arthritis. Neurological: Denies dizziness, fainting, frequent headaches, vertigo, memory loss/confusion. Psychiatric: Denies depression, anxiety/panic attacks. Respiratory: Denies wheezing, frequent cough, shortness of breath when at rest.  Vital Signs:  BP  (mmHg)  Pulse  (bpm) Rhythm Weight (lbs/oz) Height (ft/in) BMI  130/70 94 Regular 356 / 6 / 2 45.7  SPO2  (%)  97  Physical Exam:  Constitutional:  Appearance: morbidly obese. Skin:  Inspection: No rash or obvious lesion.  No spider angioma noted,Not obviously jaundiced. Palpation: No obvious induration or nodules. Eyes:  Conjunctivae/lids: Conjunctiva normal, non-icteric. Pupils/irises: PERRLA. ENMT:  External: Normal appearance of external ears, nose and mouth. Oropharynx: Normal oropharynx mucosa. Neck:  Neck: Supple with no obvious mass, asymmetry, or JVD. Thyroid: No thyromegaly noted. Respiratory:  Effort: Normal respiratory effort, with no obvious accessory muscle use. Auscultation: No rhonchi, wheezes, or rales bilaterally. Chest:  Inspection: No obvious deformities. Palpation: Costal margin and xyphoid process nontender. Cardiovascular: Auscultation: RRR, without murmur noted. Peripheral: Normal peripheral pulses bilaterally. Gastrointestinal/Abdomen:  Abdomen: Normoactive bowel sounds present,Nondistended, nontender, no rebound or guarding, no mass, no fullness. Liver/Spleen: No hepatosplenomegaly noted. Hernias: none. Extremities:  RLE: No cyanosis or edema noted. LLE: No cyanosis or edema noted. Psychiatric:  Orientation: Alert and oriented to person, place, and time. Mood and affect: Mood and affect appropriate for the situation.   Assessment:   Colon cancer screening  Obesity, morbid (BMI 40 and over)  COPD (chronic obstructive pulmonary disease)  IDDM (insulin dependent diabetes mellitus) - Type I  Cigarette nicotine dependence  Plan:   Follow-up with PCP  Colonoscopy with Plenvu Prep - clear liquid diet [facility] By: Jim Claire

## 2023-08-10 NOTE — ANESTHESIA POSTPROCEDURE EVALUATION
Department of Anesthesiology  Postprocedure Note    Patient: Ama Giordano  MRN: [id number]  YOB: 1970  Date of evaluation: 8/10/2023      Procedure Summary     Date: 08/10/23 Room / Location: [facility] ENDO 02 / [facility] ENDOSCOPY    Anesthesia Start: 0803 Anesthesia Stop: 0845    Procedure: COLONOSCOPY Diagnosis:       Colon cancer screening      Chronic obstructive pulmonary disease, unspecified COPD type ([address])      Insulin dependent diabetes mellitus type IA ([address])      Cigarette nicotine dependence with nicotine-induced disorder      (Colon cancer screening [Z12.11])      (Chronic obstructive pulmonary disease, unspecified COPD type ([address]) [J44.9])      (Insulin dependent diabetes mellitus type IA ([address]) [E10.9])      (Cigarette nicotine dependence with nicotine-induced disorder [F17.219])    Surgeons: Jacob Mckeon MD Responsible Provider: Merced Small MD    Anesthesia Type: MAC ASA Status: 3          Anesthesia Type: MAC    Gaudencio Phase I: Gaudencio Score: 10    Gaudencio Phase II: Gaudencio Score: 10      Anesthesia Post Evaluation    Patient location during evaluation: PACU  Patient participation: complete - patient participated  Level of consciousness: awake and alert  Pain score: 0  Airway patency: patent  Nausea & Vomiting: no nausea and no vomiting  Complications: no  Cardiovascular status: hemodynamically stable  Respiratory status: acceptable  Hydration status: euvolemic  Multimodal analgesia pain management approach  Pain management: adequate

## 2023-08-10 NOTE — DISCHARGE INSTRUCTIONS
Colonoscopy: What to Expect at [address]  After a colonoscopy, you'll stay at the clinic until you wake up. Then you can go home. But you'll need to arrange for a ride. Your doctor will tell you when you can eat and do your other usual activities. Your doctor will talk to you about when you'll need your next colonoscopy. Your doctor can help you decide how often you need to be checked. This will depend on the results of your test and your risk for colorectal cancer. After the test, you may be bloated or have gas pains. You may need to pass gas. If a biopsy was done or a polyp was removed, you may have streaks of blood in your stool (feces) for a few days. Problems such as heavy rectal bleeding may not occur until several weeks after the test. This isn't common. But it can happen after polyps are removed. This care sheet gives you a general idea about how long it will take for you to recover. But each person recovers at a different pace. Follow the steps below to get better as quickly as possible. How can you care for yourself at home? Activity    Rest when you feel tired. You can do your normal activities when it feels okay to do so. Diet    Follow your doctor's directions for eating. Unless your doctor has told you not to, drink plenty of fluids. This helps to replace the fluids that were lost during the colon prep. Do not drink alcohol. Medicines    Your doctor will tell you if and when you can restart your medicines. You will also be given instructions about taking any new medicines. If you take aspirin or some other blood thinner, ask your doctor if and when to start taking it again. Make sure that you understand exactly what your doctor wants you to do. If polyps were removed or a biopsy was done during the test, your doctor may tell you not to take aspirin or other anti-inflammatory medicines for a few days. These include ibuprofen (Advil, Motrin) and naproxen (Aleve). process. If you do not sign up before the expiration date, you must request a new code. i-nexus Access Code: Activation code not generated  Current i-nexus Status: Active (This is the date your ZOCKOt access code will )    Enter the last four digits of your Social Security Number (xxxx) and Date of Birth (mm/dd/yyyy) as indicated and click Submit. You will be taken to the next sign-up page. Create a ZOCKOt ID. This will be your i-nexus login ID and cannot be changed, so think of one that is secure and easy to remember. Create a i-nexus password. You can change your password at any time. Enter your Password Reset Question and Answer. This can be used at a later time if you forget your password. Enter your e-mail address. You will receive e-mail notification when new information is available in [address] [place] [address]. Click Sign Up. You can now view and download portions of your medical record. Click the Kuddle link to download a portable copy of your medical information. Additional Information    If you have questions, please call [phone]. Remember, i-nexus is NOT to be used for urgent needs. For medical emergencies, dial 911. Educational references and/or instructions provided during this visit included:    See Attached      APPOINTMENTS:    Per MD Instruction    Discharge information has been reviewed with the patient. The patient verbalized understanding.

## 2023-08-10 NOTE — ANESTHESIA PRE PROCEDURE
Department of Anesthesiology  Preprocedure Note       Name:  Tho Cotter   Age:  48 y.o.  :  1970                                          MRN:  [id number]         Date:  8/10/2023      Surgeon: Paty Cahvis):  Jovanna Pearce MD    Procedure: Procedure(s):  COLONOSCOPY    Medications prior to admission:   Prior to Admission medications    Medication Sig Start Date End Date Taking? Authorizing Provider   albuterol sulfate HFA (PROVENTIL;VENTOLIN;PROAIR) 108 (90 Base) MCG/ACT inhaler Inhale 2 puffs into the lungs every 4-6 hours as needed 23  Yes Historical Provider, MD   budesonide-formoterol (SYMBICORT) 160-4.5 MCG/ACT AERO Inhale 2 puffs into the lungs 2 times daily 23  Yes Historical Provider, MD   Dulaglutide (TRULICITY) 3 DE/6.2XD SOPN Inject 3 mg into the skin every 7 days 21  Yes Historical Provider, MD   empagliflozin (JARDIANCE) 25 MG tablet Take 1 tablet by mouth daily 23  Yes Historical Provider, MD   ibuprofen (ADVIL;MOTRIN) 800 MG tablet Take 1 tablet by mouth every 6 hours as needed 17  Yes Historical Provider, MD   insulin glargine (LANTUS SOLOSTAR) 100 UNIT/ML injection pen Take 60 Units by mouth every morning 21  Yes Historical Provider, MD   lisinopril (PRINIVIL;ZESTRIL) 20 MG tablet Take 1 tablet by mouth daily 21  Yes Historical Provider, MD   metFORMIN (GLUCOPHAGE) 1000 MG tablet Take 1 tablet by mouth 2 times daily 23  Yes Historical Provider, MD   omeprazole (PRILOSEC OTC) 20 MG tablet Take 1 tablet by mouth daily 10/5/22  Yes Historical Provider, MD   pioglitazone (ACTOS) 30 MG tablet Take 1 tablet by mouth daily 23  Yes Historical Provider, MD   atorvastatin (LIPITOR) 10 MG tablet Take 1 tablet by mouth daily 23  Yes Historical Provider, MD   oxyCODONE HCl (OXY-IR) 10 MG immediate release tablet Take 1 tablet by mouth every 4-6 hours as needed.  No more than 5 tablets per day    Historical Provider, MD       Current medications:

## (undated) DEVICE — GAUZE,SPONGE,4"X4",16PLY,STRL,LF,10/TRAY: Brand: MEDLINE

## (undated) DEVICE — CATHETER SCLERO L240CM NDL 25GA L4MM SHTH DIA2.3MM CNTRST

## (undated) DEVICE — UNDERPAD INCONT W23XL36IN STD BLU POLYPR BK FLUF SFT

## (undated) DEVICE — BASIN EMSIS 16OZ GRAPHITE PLAS KID SHP MOLD GRAD FOR ORAL

## (undated) DEVICE — SYRINGE MED 25GA 3ML L5/8IN SUBQ PLAS W/ DETACH NDL SFTY

## (undated) DEVICE — SYRINGE MED 50ML LUERSLIP TIP

## (undated) DEVICE — SYRINGE MED 10ML LUERLOCK TIP W/O SFTY DISP

## (undated) DEVICE — MEDI-VAC NON-CONDUCTIVE SUCTION TUBING: Brand: CARDINAL HEALTH

## (undated) DEVICE — CANNULA ORIG TL CLR W FOAM CUSHIONS AND 14FT SUPL TB 3 CHN

## (undated) DEVICE — SYRINGE MED 50ML LUERLOCK TIP